# Patient Record
Sex: MALE | Race: BLACK OR AFRICAN AMERICAN
[De-identification: names, ages, dates, MRNs, and addresses within clinical notes are randomized per-mention and may not be internally consistent; named-entity substitution may affect disease eponyms.]

---

## 2017-03-27 ENCOUNTER — HOSPITAL ENCOUNTER (INPATIENT)
Dept: HOSPITAL 74 - YASAS | Age: 22
LOS: 2 days | Discharge: HOME | DRG: 897 | End: 2017-03-29
Attending: INTERNAL MEDICINE | Admitting: INTERNAL MEDICINE
Payer: COMMERCIAL

## 2017-03-27 VITALS — BODY MASS INDEX: 22.8 KG/M2

## 2017-03-27 DIAGNOSIS — Z87.898: ICD-10-CM

## 2017-03-27 DIAGNOSIS — R00.1: ICD-10-CM

## 2017-03-27 DIAGNOSIS — M41.129: ICD-10-CM

## 2017-03-27 DIAGNOSIS — F17.210: ICD-10-CM

## 2017-03-27 DIAGNOSIS — F12.20: Primary | ICD-10-CM

## 2017-03-27 PROCEDURE — HZ2ZZZZ DETOXIFICATION SERVICES FOR SUBSTANCE ABUSE TREATMENT: ICD-10-PCS | Performed by: SURGERY

## 2017-03-27 RX ADMIN — Medication SCH MG: at 22:05

## 2017-03-27 NOTE — HP
COWS





- Scale


Resting Pulse: 0= [place] 80 or Below


Sweatin= Chills/Flushing


Restless Observation: 1= Difficult to Sit Still


Pupil Size: 1= Pupils >than Normal


Bone or Joint Aches: 1= Mild Discomfort


Runny Nose/ Eye Tearin= Nasal Congestion


GI Upset > 30mins: 3= Vomiting/Diarrhea


Tremor Observation: 2= Slight Tremor Visible


Yawning Observation: 0= None


Anxiety or Irritability: 2=Irritable/Anxious


Goose Flesh Skin: 3=Piloerection


COWS Score: 15





Admission ROS S





- [facility]


Chief Complaint: 


WITHDRAWAL SX


Allergies/Adverse Reactions: 


 Allergies











Allergy/AdvReac Type Severity Reaction Status Date / Time


 


No Known Allergies Allergy   Verified 17 17:08











History of Present Illness: 


21 YEARS OLD MALE WITH LONG HISTORY OF OPIATE NICOTINE DEPENDENCE HAS SCOLIOSIS 

DENIES MENTAL ILLNESS IS ADMITTED TO DETOX


Exam Limitations: No Limitations





- Ebola screening


Have you traveled outside of the country in the last 21 days: No (N)


Have you had contact with anyone from an Ebola affected area: No


Have you been sick,other than usual withdrawal symptoms: No


Do you have a fever: No





- Review of Systems


Constitutional: Chills, Loss of Appetite, Changes in sleep, Unintentional Wgt. 

Loss, Unexplained wgt Loss


EENT: reports: No Symptoms Reported


Respiratory: reports: No Symptoms reported


Cardiac: reports: No Symptoms Reported


GI: reports: Diarrhea, Nausea, Poor Appetite, Poor Fluid Intake, Vomiting, 

Abdominal cramping


: reports: No Symptoms Reported


Musculoskeletal: reports: Back Pain, Joint Pain, Muscle Pain, Neck Pain


Integumentary: reports: No Symptoms Reported


Neuro: reports: Tremors


Endocrine: reports: No Symptoms Reported


Hematology: reports: No Symptoms Reported


Psychiatric: reports: Judgement Intact, Mood/Affect Appropiate, Orientated x3


Other Systems: Reviewed and Negative





Patient History





- Patient Medical History


Hx Anemia: No


Hx Asthma: No


Hx Chronic Obstructive Pulmonary Disease (COPD): No


Hx Cancer: No


Hx Cardiac Disorders: No


Hx Congestive Heart Failure: No


Hx Hypertension: No


Hx Hypercholesterolemia: No


Hx Pacemaker: No


HX Cerebrovascular Accident: No


Hx Seizures: No


Hx Dementia: No


Hx Diabetes: No


Hx Gastrointestinal Disorders: No


Hx Liver Disease: No


Hx Genitourinary Disorders: No


Hx Sexually Transmitted Disorders: No


Hx Renal Disease (ESRD): No


Hx Thyroid Disease: No


Hx Human Immunodeficiency Virus (HIV): No


Hx Hepatitis C: No


Hx Depression: No


Hx Suicide Attempt: No


Hx Bipolar Disorder: No


Hx Schizophrenia: No





- Patient Surgical History


Past Surgical History: No





- PPD History


Previous Implant?: Yes


Documented Results: Negative w/o proof


Implanted On Prior [facility] Admission?: No


PPD to be Administered?: Yes





- Smoking Cessation


Smoking history: Current every day smoker


Have you smoked in the past 12 months: Yes


Aproximately how many cigarettes per day: 7


Cigars Per Day: 0


Hx Chewing Tobacco Use: No


Initiated information on smoking cessation: Yes


'Breaking Loose' booklet given: 17





- Substance & Tx. History


Hx Alcohol Use: No


Hx Substance Use: Yes


Substance Use Type: Marijuana, Opiates


Hx Substance Use Treatment: Yes





- Substances Abused


  ** Heroin


Route: Inhalation


Frequency: Daily


Amount used: 10 bags


Age of first use: 21


Date of Last Use: 17





  ** Marijuana/Hashish


Route: Smoking


Frequency: 1-2 times per week


Amount used: 1 bag


Age of first use: 14


Date of Last Use: 17





Family Disease History





- Family Disease History


Family Disease History: Other: Father (NO CONTACT)





Admission Physical Exam [facility]





- Vital Signs


Vital Signs: 


 Vital Signs - 24 hr











  17





  15:55


 


Temperature 97.0 F L


 


Pulse Rate 62


 


Respiratory 20





Rate 


 


Blood Pressure 126/79














- Physical


General Appearance: Yes: Appropriately Dressed, Mild Distress, Thin, Tremorous, 

Irritable, Sweating, Anxious


HEENTM: Yes: Hearing grossly Normal, Normal ENT Inspection, Normocephalic, 

Normal Voice


Respiratory: Yes: Chest Non-Tender, Lungs Clear, Normal Breath Sounds, No 

Respiratory Distress, No Accessory Muscle Use


Neck: Yes: Supple, Trachea in good position


Breast: Yes: Breasts Symetrical


Cardiology: Yes: Regular Rhythm, Regular Rate, S1, S2


Abdominal: Yes: Non Tender, Soft, Increased Bowel Sounds


Genitourinary: Yes: Within Normal Limits


Back: Yes: Normal Inspection


Musculoskeletal: Yes: full range of Motion, Gait Steady, Back pain, Muscle Pain


Extremities: Yes: Normal Inspection, Normal Range of Motion, Non-Tender, Tremors


Neurological: Yes: Fully Oriented, Alert, Motor Strength 5/5, Normal Mood/Affect

, Normal Response


Integumentary: Yes: Warm


Lymphatic: Yes: Within Normal Limits





- Diagnostic


(1) Opioid dependence with withdrawal


Current Visit: Yes   Status: Acute





(2) Cannabis dependence, uncomplicated


Current Visit: Yes   Status: Chronic





(3) Nicotine dependence


Current Visit: Yes   Status: Acute   Qualifiers: 


     Nicotine product type: cigarettes     Substance use status: in withdrawal 

       Qualified Code(s): F17.213 - Nicotine dependence, cigarettes, with 

withdrawal  





(4) Weight loss


Current Visit: Yes   Status: Acute





(5) Scoliosis


Current Visit: Yes   Status: Chronic   Qualifiers: 


     Scoliosis type: idiopathic     Idiopathic scoliosis type: adolescent     

Spinal region: unspecified        Qualified Code(s): M41.129 - Adolescent 

idiopathic scoliosis, site unspecified  








Cleared for Admission [facility]





- Detox or Rehab


[facility] Level of Care: Medically Managed


Detox Regimen/Protocol: Methadone





S Breath Alcohol Content


Breath Alcohol Content: 0





Urine Drug Screen





- Results


Drug Screen Negative: No


Urine Drug Screen Results: THC-Marijuana, OPI-Opiates, MTD-Methadone, OXY-

Oxycodone

## 2017-03-28 LAB
ALBUMIN SERPL-MCNC: 4 G/DL (ref 3.4–5)
ALP SERPL-CCNC: 107 U/L (ref 45–117)
ALT SERPL-CCNC: 73 U/L (ref 12–78)
ANION GAP SERPL CALC-SCNC: 8 MMOL/L (ref 8–16)
APPEARANCE UR: CLEAR
AST SERPL-CCNC: 29 U/L (ref 15–37)
BILIRUB SERPL-MCNC: 0.3 MG/DL (ref 0.2–1)
BILIRUB UR STRIP.AUTO-MCNC: NEGATIVE MG/DL
CALCIUM SERPL-MCNC: 9.2 MG/DL (ref 8.5–10.1)
CO2 SERPL-SCNC: 31 MMOL/L (ref 21–32)
COLOR UR: YELLOW
CREAT SERPL-MCNC: 0.7 MG/DL (ref 0.7–1.3)
DEPRECATED RDW RBC AUTO: 13.1 % (ref 11.9–15.9)
GLUCOSE SERPL-MCNC: 97 MG/DL (ref 74–106)
HIV 1 & 2 AB: NEGATIVE
HIV 1 AGP24: NEGATIVE
KETONES UR QL STRIP: NEGATIVE
LEUKOCYTE ESTERASE UR QL STRIP.AUTO: NEGATIVE
MCH RBC QN AUTO: 30.3 PG (ref 25.7–33.7)
MCHC RBC AUTO-ENTMCNC: 33.3 G/DL (ref 32–35.9)
MCV RBC: 91.1 FL (ref 80–96)
NITRITE UR QL STRIP: NEGATIVE
PH UR: 6 [PH] (ref 5–8)
PLATELET # BLD AUTO: 200 K/MM3 (ref 134–434)
PMV BLD: 10.3 FL (ref 7.5–11.1)
PROT SERPL-MCNC: 7.1 G/DL (ref 6.4–8.2)
PROT UR QL STRIP: NEGATIVE
PROT UR QL STRIP: NEGATIVE
RBC # UR STRIP: NEGATIVE /UL
SP GR UR: 1.03 (ref 1–1.03)
UROBILINOGEN UR STRIP-MCNC: NEGATIVE E.U./DL (ref 0.2–1)
WBC # BLD AUTO: 5.8 K/MM3 (ref 4–10)

## 2017-03-28 RX ADMIN — NICOTINE SCH: 14 PATCH, EXTENDED RELEASE TRANSDERMAL at 10:14

## 2017-03-28 RX ADMIN — Medication SCH TAB: at 10:15

## 2017-03-28 RX ADMIN — Medication SCH MG: at 21:35

## 2017-03-28 NOTE — EKG
Test Reason : 

Blood Pressure : ***/*** mmHG

Vent. Rate : 058 BPM     Atrial Rate : 058 BPM

   P-R Int : 138 ms          QRS Dur : 092 ms

    QT Int : 418 ms       P-R-T Axes : 060 059 020 degrees

   QTc Int : 410 ms

 

SINUS BRADYCARDIA

OTHERWISE NORMAL ECG

NO PREVIOUS ECGS AVAILABLE

Confirmed by DINO ESPOSITO MD ([id number]) on 3/28/2017 1:42:48 PM

 

Referred By:             Confirmed By:DINO ESPOSITO MD

## 2017-03-28 NOTE — PN
[facility] COWS





- Scale


Resting Pulse: 0= [place] 80 or Below


Sweatin=Flushed/Facial Moisture


Restless Observation: 3= Extraneous Movement


Pupil Size: 1= Pupils >than Normal


Bone or Joint Aches: 2= Severe Diffuse Aches


Runny Nose/ Eye Tearin= Runny Nose/Eyes


GI Upset > 30mins: 3= Vomiting/Diarrhea


Tremor Observation of Outstretched Hands: 2= Slight Tremor Visible


Yawning Observation: 1= 1-2x During Session


Anxiety or Irritability: 2=Irritable/Anxious


Goose Flesh Skin: 0=Smooth Skin


COWS Score: 18





[facility] Progress Note (SOAP)


Subjective: 


ALERT,IRRITABLE,ANXIOUS,INTERRUPTED SLEEP,TREMOR,PAIN IN THE BODY AND BACK


Objective: 





17 10:38


 Vital Signs











Temperature  97.3 F L  17 09:56


 


Pulse Rate  58 L  17 09:56


 


Respiratory Rate  18   17 09:56


 


Blood Pressure  133/72   17 09:56


 


O2 Sat by Pulse Oximetry (%)      








EKG SINUS BRADYCARDIA 58


NO CHEST PAIN,NO SOB,NO DIZZINESS


 Laboratory Last Values











Sodium  139 mmol/L (136-145)   17  06:00    


 


Potassium  3.7 mmol/L (3.5-5.1)   17  06:00    


 


Chloride  100 mmol/L ()   17  06:00    


 


Carbon Dioxide  31 mmol/L (21-32)   17  06:00    


 


Anion Gap  8  (8-16)   17  06:00    


 


BUN  15 mg/dL (7-18)   17  06:00    


 


Creatinine  0.7 mg/dL (0.7-1.3)   17  06:00    


 


Creat Clearance w eGFR  > 60  (>60)   17  06:00    


 


Random Glucose  97 mg/dL ()   17  06:00    


 


Calcium  9.2 mg/dL (8.5-10.1)   17  06:00    


 


Total Bilirubin  0.3 mg/dL (0.2-1.0)   17  06:00    


 


AST  29 U/L (15-37)   17  06:00    


 


ALT  73 U/L (12-78)   17  06:00    


 


Alkaline Phosphatase  107 U/L ()   17  06:00    


 


Total Protein  7.1 g/dl (6.4-8.2)   17  06:00    


 


Albumin  4.0 g/dl (3.4-5.0)   17  06:00    


 


Urine Color  Yellow   17  01:00    


 


Urine Appearance  Clear   17  01:00    


 


Urine pH  6.0  (5.0-8.0)   17  01:00    


 


Ur Specific Gravity  1.026  (1.001-1.035)   17  01:00    


 


Urine Protein  Negative  (NEGATIVE)   17  01:00    


 


Urine Glucose (UA)  Negative  (NEGATIVE)   17  01:00    


 


Urine Ketones  Negative  (NEGATIVE)   17  01:00    


 


Urine Blood  Negative  (NEGATIVE)   17  01:00    


 


Urine Nitrite  Negative  (NEGATIVE)   17  01:00    


 


Urine Bilirubin  Negative  (NEGATIVE)   17  01:00    


 


Urine Urobilinogen  Negative E.U./dl (0.2-1.0)   17  01:00    


 


Ur Leukocyte Esterase  Negative  (NEGATIVE)   17  01:00    








LABS PENDING


Assessment: 





17 10:40


WITHDRAWAL SYMPTOM


Plan: 


CONTINUE DETOX

## 2017-03-29 VITALS — TEMPERATURE: 96.4 F | DIASTOLIC BLOOD PRESSURE: 67 MMHG | SYSTOLIC BLOOD PRESSURE: 105 MMHG | HEART RATE: 60 BPM

## 2017-03-29 RX ADMIN — Medication SCH TAB: at 10:42

## 2017-03-29 RX ADMIN — NICOTINE SCH: 14 PATCH, EXTENDED RELEASE TRANSDERMAL at 10:42

## 2017-03-29 NOTE — PN
[facility] COWS





- Scale


Resting Pulse: 0= [place] 80 or Below


Sweatin=Flushed/Facial Moisture


Restless Observation: 1= Difficult to Sit Still


Pupil Size: 0= Normal to Room Light


Bone or Joint Aches: 1= Mild Discomfort


Runny Nose/ Eye Tearin= Nasal Congestion


GI Upset > 30mins: 0= None


Tremor Observation of Outstretched Hands: 2= Slight Tremor Visible


Yawning Observation: 2= >3x During Session


Anxiety or Irritability: 2=Irritable/Anxious


Goose Flesh Skin: 0=Smooth Skin


COWS Score: 11





BHS Progress Note (SOAP)


Subjective: 


agitation


anxiety


sweats


interrupted sleep


irritable


Objective: 





17 11:22


 Vital Signs











Temperature  98.2 F   17 10:00


 


Pulse Rate  80   17 10:00


 


Respiratory Rate  16   17 10:00


 


Blood Pressure  128/63   17 10:00


 


O2 Sat by Pulse Oximetry (%)      








 Laboratory Tests











  17





  06:00 01:00 06:00


 


WBC    5.8


 


RBC    4.44


 


Hgb    13.5


 


Hct    40.5


 


MCV    91.1


 


MCHC    33.3


 


RDW    13.1


 


Plt Count    200


 


MPV    10.3


 


Sodium   


 


Potassium   


 


Chloride   


 


Carbon Dioxide   


 


Anion Gap   


 


BUN   


 


Creatinine   


 


Creat Clearance w eGFR   


 


Random Glucose   


 


Calcium   


 


Total Bilirubin   


 


AST   


 


ALT   


 


Alkaline Phosphatase   


 


Total Protein   


 


Albumin   


 


Urine Color   Yellow 


 


Urine Appearance   Clear 


 


Urine pH   6.0 


 


Ur Specific Gravity   1.026 


 


Urine Protein   Negative 


 


Urine Glucose (UA)   Negative 


 


Urine Ketones   Negative 


 


Urine Blood   Negative 


 


Urine Nitrite   Negative 


 


Urine Bilirubin   Negative 


 


Urine Urobilinogen   Negative 


 


Ur Leukocyte Esterase   Negative 


 


RPR Titer   


 


HIV 1&2 Antibody Screen  Negative  


 


HIV P24 Antigen  Negative  














  17





  06:00 06:00


 


WBC  


 


RBC  


 


Hgb  


 


Hct  


 


MCV  


 


MCHC  


 


RDW  


 


Plt Count  


 


MPV  


 


Sodium  139 


 


Potassium  3.7 


 


Chloride  100 


 


Carbon Dioxide  31 


 


Anion Gap  8 


 


BUN  15 


 


Creatinine  0.7 


 


Creat Clearance w eGFR  > 60 


 


Random Glucose  97 


 


Calcium  9.2 


 


Total Bilirubin  0.3 


 


AST  29 


 


ALT  73 


 


Alkaline Phosphatase  107 


 


Total Protein  7.1 


 


Albumin  4.0 


 


Urine Color  


 


Urine Appearance  


 


Urine pH  


 


Ur Specific Gravity  


 


Urine Protein  


 


Urine Glucose (UA)  


 


Urine Ketones  


 


Urine Blood  


 


Urine Nitrite  


 


Urine Bilirubin  


 


Urine Urobilinogen  


 


Ur Leukocyte Esterase  


 


RPR Titer   Nonreactive


 


HIV 1&2 Antibody Screen  


 


HIV P24 Antigen  








awake/alert


ambulating


no acute distress


Assessment: 





17 11:23


withdrawal sx


Plan: 


continue detox


increase fluids

## 2017-03-29 NOTE — PN
[facility] Progress Note


Note: 


INFORMED CLIENT LEFT OFF UNIT FOR 20 MIN UNSUPERVISED


ADMINISTRATIVELY DC


CLIENT STABLE


 Vital Signs











  03/29/17 03/29/17 03/29/17





  16:00 17:25 21:44


 


Temperature 98.1 F 97.0 F L 96.4 F L


 


Pulse Rate 67 101 H 60


 


Respiratory 18 20 20





Rate   


 


Blood Pressure 136/76 120/58 105/67

## 2017-06-02 ENCOUNTER — HOSPITAL ENCOUNTER (INPATIENT)
Dept: HOSPITAL 74 - YASAS | Age: 22
LOS: 1 days | Discharge: HOME | DRG: 897 | End: 2017-06-03
Attending: INTERNAL MEDICINE | Admitting: INTERNAL MEDICINE
Payer: COMMERCIAL

## 2017-06-02 VITALS — BODY MASS INDEX: 21.2 KG/M2

## 2017-06-02 DIAGNOSIS — F11.23: Primary | ICD-10-CM

## 2017-06-02 DIAGNOSIS — F31.81: ICD-10-CM

## 2017-06-02 DIAGNOSIS — F17.213: ICD-10-CM

## 2017-06-02 DIAGNOSIS — F12.20: ICD-10-CM

## 2017-06-02 DIAGNOSIS — M41.129: ICD-10-CM

## 2017-06-02 LAB
ALBUMIN SERPL-MCNC: 3.9 G/DL (ref 3.4–5)
ALP SERPL-CCNC: 102 U/L (ref 45–117)
ALT SERPL-CCNC: 30 U/L (ref 12–78)
ANION GAP SERPL CALC-SCNC: 8 MMOL/L (ref 8–16)
AST SERPL-CCNC: 18 U/L (ref 15–37)
BILIRUB SERPL-MCNC: 0.2 MG/DL (ref 0.2–1)
CALCIUM SERPL-MCNC: 8.9 MG/DL (ref 8.5–10.1)
CO2 SERPL-SCNC: 29 MMOL/L (ref 21–32)
COCKROFT - GAULT: 113.67
CREAT SERPL-MCNC: 0.9 MG/DL (ref 0.7–1.3)
DEPRECATED RDW RBC AUTO: 14.2 % (ref 11.9–15.9)
GLUCOSE SERPL-MCNC: 92 MG/DL (ref 74–106)
HIV 1 & 2 AB: NEGATIVE
HIV 1 AGP24: NEGATIVE
MCH RBC QN AUTO: 31.3 PG (ref 25.7–33.7)
MCHC RBC AUTO-ENTMCNC: 34.7 G/DL (ref 32–35.9)
MCV RBC: 90.4 FL (ref 80–96)
PLATELET # BLD AUTO: 231 K/MM3 (ref 134–434)
PMV BLD: 9.6 FL (ref 7.5–11.1)
PROT SERPL-MCNC: 7 G/DL (ref 6.4–8.2)
WBC # BLD AUTO: 6.4 K/MM3 (ref 4–10)

## 2017-06-02 RX ADMIN — Medication SCH TAB: at 10:11

## 2017-06-02 RX ADMIN — NICOTINE SCH MG: 14 PATCH, EXTENDED RELEASE TRANSDERMAL at 10:12

## 2017-06-02 NOTE — EKG
Test Reason : 

Blood Pressure : ***/*** mmHG

Vent. Rate : 056 BPM     Atrial Rate : 056 BPM

   P-R Int : 142 ms          QRS Dur : 096 ms

    QT Int : 414 ms       P-R-T Axes : 054 062 028 degrees

   QTc Int : 399 ms

 

SINUS BRADYCARDIA WITH SINUS ARRHYTHMIA

WHEN COMPARED WITH ECG OF 27-MAR-2017 18:34,

T WAVE AMPLITUDE HAS INCREASED IN ANTEROLATERAL LEADS

Confirmed by THOR MORGAN MD ([id number]) on 6/2/2017 9:50:43 AM

 

Referred By:             Confirmed By:THOR MORGAN MD

## 2017-06-02 NOTE — HP
COWS





- Scale


Resting Pulse: 0= [place] 80 or Below


Sweatin= Chills/Flushing


Restless Observation: 1= Difficult to Sit Still


Pupil Size: 1= Pupils >than Normal


Bone or Joint Aches: 2= Severe Diffuse Aches


Runny Nose/ Eye Tearin= Runny Nose/Eyes


GI Upset > 30mins: 2= Nausea/Diarrhea


Tremor Observation: 1= Tremor Felt, Not Seen


Yawning Observation: 1= 1-2x During Session


Anxiety or Irritability: 2=Irritable/Anxious


Goose Flesh Skin: 3=Piloerection


COWS Score: 16





Admission ROS S





- HPI


Chief Complaint: 


WITHDRAWAL SYMPTOMS 


Allergies/Adverse Reactions: 


 Allergies











Allergy/AdvReac Type Severity Reaction Status Date / Time


 


No Known Allergies Allergy   Verified 17 00:59











History of Present Illness: 


22 Y.O. MAN WITH AN EXTENSIVE HISTORY OF OPIOID DEPENDENCE IS HERE SEEKING 

DETOX.  


Exam Limitations: No Limitations





- Ebola screening


Have you traveled outside of the country in the last 21 days: No





- Review of Systems


Constitutional: Chills, Loss of Appetite, Night Sweats, Unintentional Wgt. Loss


EENT: reports: Tearing, Nose Congestion


Respiratory: reports: No Symptoms reported


Cardiac: reports: No Symptoms Reported


GI: reports: Diarrhea, Poor Appetite, Abdominal cramping


: reports: No Symptoms Reported


Musculoskeletal: reports: Back Pain, Neck Pain


Integumentary: reports: No Symptoms Reported


Neuro: reports: Tremors


Endocrine: reports: No Symptoms Reported


Hematology: reports: No Symptoms Reported


Psychiatric: reports: Mood/Affect Appropiate, Orientated x3, Anxious


Other Systems: Reviewed and Negative





Patient History





- Patient Medical History


Hx Anemia: No


Hx Asthma: No


Hx Chronic Obstructive Pulmonary Disease (COPD): No


Hx Cancer: No


Hx Cardiac Disorders: No


Hx Congestive Heart Failure: No


Hx Hypertension: No


Hx Hypercholesterolemia: No


Hx Pacemaker: No


HX Cerebrovascular Accident: No


Hx Seizures: No


Hx Dementia: No


Hx Diabetes: No


Hx Gastrointestinal Disorders: No


Hx Liver Disease: No


Hx Genitourinary Disorders: No


Hx Sexually Transmitted Disorders: No


Hx Renal Disease (ESRD): No


Hx Thyroid Disease: No


Hx Human Immunodeficiency Virus (HIV): No


Hx Hepatitis C: No


Hx Depression: No


Hx Suicide Attempt: No


Hx Bipolar Disorder: No


Hx Schizophrenia: No





- Patient Surgical History


Past Surgical History: No





- PPD History


Previous Implant?: Yes


Documented Results: Negative w/proof


Implanted On Prior [facility] Admission?: Yes


Date: 17


Results: 0


PPD to be Administered?: No





- Reproductive History


Patient is a Female of Child Bearing Age (11 -55 yrs old): No





- Smoking Cessation


Smoking history: Current every day smoker


Have you smoked in the past 12 months: Yes


Aproximately how many cigarettes per day: 5


Cigars Per Day: 0


Hx Chewing Tobacco Use: No


Initiated information on smoking cessation: Yes


'Breaking Loose' booklet given: 17





- Substance & Tx. History


Hx Alcohol Use: No


Hx Substance Use: Yes


Substance Use Type: Heroin


Hx Substance Use Treatment: Yes (Detox )





- Substances Abused


  ** Heroin


Route: Inhalation


Frequency: Daily


Amount used: 1 bundle 


Age of first use: 21


Date of Last Use: 17





Family Disease History





- Family Disease History


Family Disease History: Other: Father (NO CONTACT)





Admission Physical Exam [facility]





- Vital Signs


Vital Signs: 


 Last Vital Signs











Temp Pulse Resp BP Pulse Ox


 


 97.1 F L  57 L  16   130/71    


 


 17 00:52  17 00:52  17 00:52  17 00:52   














- Diagnostic


(1) Nicotine dependence


Current Visit: Yes   Status: Chronic   Qualifiers: 


     Nicotine product type: cigarettes     Substance use status: in withdrawal 

       Qualified Code(s): F17.213 - Nicotine dependence, cigarettes, with 

withdrawal  





(2) Opioid dependence with withdrawal


Current Visit: Yes   Status: Chronic





(3) Weight loss


Current Visit: Yes   Status: Acute





(4) Scoliosis


Current Visit: Yes   Status: Chronic   Qualifiers: 


     Scoliosis type: idiopathic     Idiopathic scoliosis type: adolescent     

Spinal region: unspecified        Qualified Code(s): M41.129 - Adolescent 

idiopathic scoliosis, site unspecified  








Cleared for Admission [facility]





- Detox or Rehab


S Level of Care: Medically Managed


Detox Regimen/Protocol: Methadone





BHS Breath Alcohol Content


Breath Alcohol Content: 0





Vital Signs





- Vital Signs


Vital Signs Refused: No


Temperature: 97.1 F


Temperature Source: Oral


Pulse Rate: 57


Respiratory Rate: 16


Blood Pressure: 130/71


BP Location: Left Arm


Blood Pressure Position: Sitting





- Height


Height: 5 ft 8 in





- Weight


Weight: 140 lb


Weight Measurement Method: Standing Scale


Body Mass Index (BMI): 21.2





Urine Drug Screen





- Test Device


Lot Number: [id number]


Expiration Date: 19





- Control


Is Test Valid: Yes





- Results


Drug Screen Negative: No


Urine Drug Screen Results: THC-Marijuana, OPI-Opiates, MTD-Methadone

## 2017-06-02 NOTE — PN
[facility] COWS





- Scale


Resting Pulse: 0= [place] 80 or Below


Sweatin=Flushed/Facial Moisture


Restless Observation: 1= Difficult to Sit Still


Pupil Size: 0= Normal to Room Light


Bone or Joint Aches: 2= Severe Diffuse Aches


Runny Nose/ Eye Tearin= Nasal Congestion


GI Upset > 30mins: 2= Nausea/Diarrhea


Tremor Observation of Outstretched Hands: 2= Slight Tremor Visible


Yawning Observation: 1= 1-2x During Session


Anxiety or Irritability: 2=Irritable/Anxious


Goose Flesh Skin: 3=Piloerection


COWS Score: 16





BHS Progress Note (SOAP)


Subjective: 


Diarrhea, Interrupted sleep, Body Aches, Stomach Cramping, Sweating.


Objective: 


PT. A & O X 3, OBSERVED AMBULATING ON UNIT. NO ACUTE DISTRESS.





17 12:02


 Vital Signs











Temperature  97.3 F L  17 10:07


 


Pulse Rate  67   17 10:07


 


Respiratory Rate  18   17 10:07


 


Blood Pressure  129/63   17 10:07


 


O2 Sat by Pulse Oximetry (%)      








 Laboratory Tests











  17





  07:50 07:50


 


WBC  6.4 


 


RBC  4.46 


 


Hgb  14.0 


 


Hct  40.3 


 


MCV  90.4 


 


MCHC  34.7 


 


RDW  14.2 


 


Plt Count  231 


 


MPV  9.6 


 


Sodium   143


 


Potassium   3.9


 


Chloride   106


 


Carbon Dioxide   29


 


Anion Gap   8


 


BUN   15


 


Creatinine   0.9  D


 


Creat Clearance w eGFR   > 60


 


Random Glucose   92


 


Calcium   8.9


 


Total Bilirubin   0.2  D


 


AST   18  D


 


ALT   30  D


 


Alkaline Phosphatase   102


 


Total Protein   7.0


 


Albumin   3.9








LABS NOTED.





17 12:03








17 12:05





Assessment: 





17 12:03


WITHDRAWAL SYMPTOMS.


Plan: 


CONTINUE DETOX.


PRN IMMODIUM FOR DIARRHEA.

## 2017-06-02 NOTE — CONSULT
[facility] Psychiatric Consult





- Data


Date of interview: 06/02/17


Admission source: [facility]


Identifying data: This is 23 yo single  male,unemployed,undomiciled 

admitted to [address] [place] [address] for Opioid dependence.


Substance Abuse History: Patient reports heroin use since 21 years old,1 bundle 

daily,marijuana since young age daily user.


Medical History: Significant for Scoliosis.


Psychiatric History: Patient is poor historian due to overly sedation.He 

reports history of Bipolar disorder.No psychiatric hospitalizations,no suicidal 

attempts reported.Patient is not on any psychotropic medications at present,but 

reports taking Seroquel on and off.Seroquel was not ordered at this time due to 

his drowsiness,sedation.Consider mood stabilizers if needed.


Physical/Sexual Abuse/Trauma History: denies





Mental Status Exam





- Mental Status Exam


Cognitive Function: Fair


Patient Appearance: Unkempt


Mood: Apathetic, Withdrawn


Affect: Mood Congruent


Patient Behavior: Sedated


Speech Pattern: Delayed


Voice Loudness: Normal


Thought Process: Goal Oriented


Thought Disorder: Not Present


Hallucinations: Denies


Suicidal Ideation: Denies


Homicidal Ideation: Denies


Insight/Judgement: Impaired


Sleep: Fair


Appetite: Fair


Muscle strength/Tone: Normal


Gait/Station: Normal





Psychiatric Findings





- Problem List (Axis 1, 2,3)


(1) Nicotine dependence


Current Visit: Yes   Status: Chronic   Qualifiers: 


     Nicotine product type: cigarettes     Substance use status: in withdrawal 

       Qualified Code(s): F17.213 - Nicotine dependence, cigarettes, with 

withdrawal  





(2) Opioid dependence with withdrawal


Current Visit: Yes   Status: Chronic





(3) Scoliosis


Current Visit: Yes   Status: Chronic   Qualifiers: 


     Scoliosis type: idiopathic     Idiopathic scoliosis type: adolescent     

Spinal region: unspecified        Qualified Code(s): M41.129 - Adolescent 

idiopathic scoliosis, site unspecified  





(4) Cannabis dependence, uncomplicated


Current Visit: Yes   Status: Chronic





(5) Bipolar II disorder


Current Visit: Yes   Status: Chronic








- Initial Treatment Plan


Initial Treatment Plan: Will monitor porgress.Consider mood stabilizers if 

needed.

## 2017-06-03 VITALS — DIASTOLIC BLOOD PRESSURE: 64 MMHG | SYSTOLIC BLOOD PRESSURE: 116 MMHG | HEART RATE: 65 BPM | TEMPERATURE: 97.6 F

## 2017-06-03 LAB
APPEARANCE UR: CLEAR
BILIRUB UR STRIP.AUTO-MCNC: NEGATIVE MG/DL
COLOR UR: (no result)
KETONES UR QL STRIP: NEGATIVE
LEUKOCYTE ESTERASE UR QL STRIP.AUTO: NEGATIVE
NITRITE UR QL STRIP: NEGATIVE
PH UR: 6 [PH] (ref 5–8)
PROT UR QL STRIP: NEGATIVE
PROT UR QL STRIP: NEGATIVE
RBC # UR STRIP: NEGATIVE /UL
SP GR UR: 1.02 (ref 1–1.02)
UROBILINOGEN UR STRIP-MCNC: NEGATIVE E.U./DL (ref 0.2–1)

## 2017-06-03 PROCEDURE — HZ2ZZZZ DETOXIFICATION SERVICES FOR SUBSTANCE ABUSE TREATMENT: ICD-10-PCS | Performed by: INTERNAL MEDICINE

## 2017-06-03 RX ADMIN — NICOTINE SCH MG: 14 PATCH, EXTENDED RELEASE TRANSDERMAL at 10:09

## 2017-06-03 RX ADMIN — Medication SCH TAB: at 10:09

## 2017-06-03 NOTE — PN
[facility] COWS





- Scale


Resting Pulse: 0= [place] 80 or Below


Sweatin=Flushed/Facial Moisture


Restless Observation: 1= Difficult to Sit Still


Pupil Size: 0= Normal to Room Light


Bone or Joint Aches: 2= Severe Diffuse Aches


Runny Nose/ Eye Tearin= Nasal Congestion


GI Upset > 30mins: 2= Nausea/Diarrhea


Tremor Observation of Outstretched Hands: 2= Slight Tremor Visible


Yawning Observation: 1= 1-2x During Session


Anxiety or Irritability: 2=Irritable/Anxious


Goose Flesh Skin: 3=Piloerection


COWS Score: 16





BHS Progress Note (SOAP)


Subjective: 


Stomach Cramping, Body aches, H/A, Diarrhea, Interrupted Sleep, Tremors, 

Sweating.


Objective: 


PT. A & O X 3. NO ACUTE DISTRESS.





17 14:37


 Vital Signs











Temperature  98.2 F   17 13:00


 


Pulse Rate  58 L  17 13:00


 


Respiratory Rate  18   17 13:00


 


Blood Pressure  113/59   17 13:00


 


O2 Sat by Pulse Oximetry (%)      








 Laboratory Tests











  17





  07:50 07:50 07:50


 


WBC   6.4 


 


RBC   4.46 


 


Hgb   14.0 


 


Hct   40.3 


 


MCV   90.4 


 


MCHC   34.7 


 


RDW   14.2 


 


Plt Count   231 


 


MPV   9.6 


 


Sodium    143


 


Potassium    3.9


 


Chloride    106


 


Carbon Dioxide    29


 


Anion Gap    8


 


BUN    15


 


Creatinine    0.9  D


 


Creat Clearance w eGFR    > 60


 


Random Glucose    92


 


Calcium    8.9


 


Total Bilirubin    0.2  D


 


AST    18  D


 


ALT    30  D


 


Alkaline Phosphatase    102


 


Total Protein    7.0


 


Albumin    3.9


 


Urine Color   


 


Urine Appearance   


 


Urine pH   


 


Ur Specific Gravity   


 


Urine Protein   


 


Urine Glucose (UA)   


 


Urine Ketones   


 


Urine Blood   


 


Urine Nitrite   


 


Urine Bilirubin   


 


Urine Urobilinogen   


 


Ur Leukocyte Esterase   


 


Hepatitis C Antibody  <0.1  


 


HIV 1&2 Antibody Screen   


 


HIV P24 Antigen   














  17





  07:50 07:00


 


WBC  


 


RBC  


 


Hgb  


 


Hct  


 


MCV  


 


MCHC  


 


RDW  


 


Plt Count  


 


MPV  


 


Sodium  


 


Potassium  


 


Chloride  


 


Carbon Dioxide  


 


Anion Gap  


 


BUN  


 


Creatinine  


 


Creat Clearance w eGFR  


 


Random Glucose  


 


Calcium  


 


Total Bilirubin  


 


AST  


 


ALT  


 


Alkaline Phosphatase  


 


Total Protein  


 


Albumin  


 


Urine Color   Ltyellow


 


Urine Appearance   Clear


 


Urine pH   6.0


 


Ur Specific Gravity   1.025


 


Urine Protein   Negative


 


Urine Glucose (UA)   Negative


 


Urine Ketones   Negative


 


Urine Blood   Negative


 


Urine Nitrite   Negative


 


Urine Bilirubin   Negative


 


Urine Urobilinogen   Negative


 


Ur Leukocyte Esterase   Negative


 


Hepatitis C Antibody  


 


HIV 1&2 Antibody Screen  Negative 


 


HIV P24 Antigen  Negative 








LABS NOTED.


Assessment: 





17 14:38


WITHDRAWAL SYMPTOMS.


Plan: 


CONTINUE DETOX.

## 2017-06-03 NOTE — PN
[facility] Progress Note


Note: 


CLIENT ADMINISTRATIVELY DC FOR PHYSICAL AGRESSION TOWARDS ANOTHER PT. CLIENT IS 

A/O X3 NAD. VSS


 Vital Signs











Temperature  99.0 F   06/03/17 17:27


 


Pulse Rate  72   06/03/17 17:27


 


Respiratory Rate  18   06/03/17 17:27


 


Blood Pressure  117/69   06/03/17 17:27


 


O2 Sat by Pulse Oximetry (%)

## 2017-06-04 NOTE — DS
[facility] Discharge Summary


Admission Date: 


06/02/17





Discharge Date: 06/03/17





- History


Present History: Cannabis Dependence, Opioid Dependence


Pertinent Past History: 


Mood disorder





- Physical Exam Results


Vital Signs: 


 Vital Signs











Temperature  97.6 F   06/03/17 23:03


 


Pulse Rate  65   06/03/17 23:03


 


Respiratory Rate  18   06/03/17 23:03


 


Blood Pressure  116/64   06/03/17 23:03


 


O2 Sat by Pulse Oximetry (%)      











Pertinent Admission Physical Exam Findings: 


Withdrawal sx.


 Laboratory Last Values











WBC  6.4 K/mm3 (4.0-10.0)   06/02/17  07:50    


 


RBC  4.46 M/mm3 (4.00-5.60)   06/02/17  07:50    


 


Hgb  14.0 GM/dL (11.7-16.9)   06/02/17  07:50    


 


Hct  40.3 % (35.4-49)   06/02/17  07:50    


 


MCV  90.4 fl (80-96)   06/02/17  07:50    


 


MCHC  34.7 g/dl (32.0-35.9)   06/02/17  07:50    


 


RDW  14.2 % (11.9-15.9)   06/02/17  07:50    


 


Plt Count  231 K/MM3 (134-434)   06/02/17  07:50    


 


MPV  9.6 fl (7.5-11.1)   06/02/17  07:50    


 


Sodium  143 mmol/L (136-145)   06/02/17  07:50    


 


Potassium  3.9 mmol/L (3.5-5.1)   06/02/17  07:50    


 


Chloride  106 mmol/L ()   06/02/17  07:50    


 


Carbon Dioxide  29 mmol/L (21-32)   06/02/17  07:50    


 


Anion Gap  8  (8-16)   06/02/17  07:50    


 


BUN  15 mg/dL (7-18)   06/02/17  07:50    


 


Creatinine  0.9 mg/dL (0.7-1.3)  D 06/02/17  07:50    


 


Creat Clearance w eGFR  > 60  (>60)   06/02/17  07:50    


 


Random Glucose  92 mg/dL ()   06/02/17  07:50    


 


Calcium  8.9 mg/dL (8.5-10.1)   06/02/17  07:50    


 


Total Bilirubin  0.2 mg/dL (0.2-1.0)  D 06/02/17  07:50    


 


AST  18 U/L (15-37)  D 06/02/17  07:50    


 


ALT  30 U/L (12-78)  D 06/02/17  07:50    


 


Alkaline Phosphatase  102 U/L ()   06/02/17  07:50    


 


Total Protein  7.0 g/dl (6.4-8.2)   06/02/17  07:50    


 


Albumin  3.9 g/dl (3.4-5.0)   06/02/17  07:50    


 


Urine Color  Ltyellow   06/03/17  07:00    


 


Urine Appearance  Clear   06/03/17  07:00    


 


Urine pH  6.0  (5.0-8.0)   06/03/17  07:00    


 


Ur Specific Gravity  1.025  (1.005-1.025)   06/03/17  07:00    


 


Urine Protein  Negative  (NEGATIVE)   06/03/17  07:00    


 


Urine Glucose (UA)  Negative  (NEGATIVE)   06/03/17  07:00    


 


Urine Ketones  Negative  (NEGATIVE)   06/03/17  07:00    


 


Urine Blood  Negative  (NEGATIVE)   06/03/17  07:00    


 


Urine Nitrite  Negative  (NEGATIVE)   06/03/17  07:00    


 


Urine Bilirubin  Negative  (NEGATIVE)   06/03/17  07:00    


 


Urine Urobilinogen  Negative E.U./dl (0.2-1.0)   06/03/17  07:00    


 


Ur Leukocyte Esterase  Negative  (NEGATIVE)   06/03/17  07:00    


 


RPR Titer  Nonreactive  (NONREACTIVE)   06/03/17  09:20    


 


Hepatitis C Antibody  <0.1 s/co ratio (0.0-0.9)   06/02/17  07:50    


 


HIV 1&2 Antibody Screen  Negative   06/02/17  07:50    


 


HIV P24 Antigen  Negative   06/02/17  07:50    








labs noted





- Treatment


Patient has Accepted a Rehab Referral to: IOP





- Medication


Discharge Medications: 


Ambulatory Orders





NK [No Known Home Medication]  06/04/17 











- Diagnosis


(1) Bipolar II disorder


Status: Chronic





(2) Cannabis dependence, uncomplicated


Status: Acute





(3) Nicotine dependence


Status: Acute   Qualifiers: 


     Nicotine product type: cigarettes     Substance use status: in withdrawal 

       Qualified Code(s): F17.213 - Nicotine dependence, cigarettes, with 

withdrawal  





(4) Opioid dependence with withdrawal


Status: Acute








- AMA


Did Patient Leave Against Medical Advice: No

## 2017-07-10 ENCOUNTER — EMERGENCY (EMERGENCY)
Facility: HOSPITAL | Age: 22
LOS: 1 days | Discharge: HOME | End: 2017-07-10
Admitting: EMERGENCY MEDICINE
Payer: COMMERCIAL

## 2017-07-10 VITALS
RESPIRATION RATE: 17 BRPM | TEMPERATURE: 98 F | WEIGHT: 145.06 LBS | HEART RATE: 60 BPM | OXYGEN SATURATION: 97 % | SYSTOLIC BLOOD PRESSURE: 115 MMHG | DIASTOLIC BLOOD PRESSURE: 72 MMHG

## 2017-07-10 DIAGNOSIS — M54.5 LOW BACK PAIN: ICD-10-CM

## 2017-07-10 DIAGNOSIS — Z88.8 ALLERGY STATUS TO OTHER DRUGS, MEDICAMENTS AND BIOLOGICAL SUBSTANCES STATUS: ICD-10-CM

## 2017-07-10 DIAGNOSIS — M54.6 PAIN IN THORACIC SPINE: ICD-10-CM

## 2017-07-10 PROCEDURE — 99282 EMERGENCY DEPT VISIT SF MDM: CPT | Mod: 25

## 2017-07-10 PROCEDURE — 99282 EMERGENCY DEPT VISIT SF MDM: CPT

## 2017-07-10 RX ORDER — LIDOCAINE 4 G/100G
1 CREAM TOPICAL
Qty: 14 | Refills: 0 | OUTPATIENT
Start: 2017-07-10

## 2017-07-10 RX ORDER — CYCLOBENZAPRINE HYDROCHLORIDE 10 MG/1
1 TABLET, FILM COATED ORAL
Qty: 12 | Refills: 0 | OUTPATIENT
Start: 2017-07-10

## 2017-07-10 RX ORDER — IBUPROFEN 200 MG
600 TABLET ORAL ONCE
Qty: 0 | Refills: 0 | Status: COMPLETED | OUTPATIENT
Start: 2017-07-10 | End: 2017-07-10

## 2017-07-10 RX ORDER — CYCLOBENZAPRINE HYDROCHLORIDE 10 MG/1
10 TABLET, FILM COATED ORAL ONCE
Qty: 0 | Refills: 0 | Status: COMPLETED | OUTPATIENT
Start: 2017-07-10 | End: 2017-07-10

## 2017-07-10 NOTE — ED PROVIDER NOTE - NS ED ROS FT
CONST:  no fever/chills  NEURO: no headache or dizziness, no weakness  CARDIO: no chest pain  PULM: no dyspnea   GI: no abdominal pain, nausea or vomiting
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
## 2017-07-10 NOTE — ED PROVIDER NOTE - PHYSICAL EXAMINATION
CONSTITUTIONAL: WD,WN. NAD.    SKIN: Normal color and turgor. No rash.    EYES: Conjunctiva clear. EOMI. PERRL.    ENT: Airway patent, OP clear. Nasal mucosa clear, no rhinorrhea.   RESPIRATORY:  Breathing non-labored. No retractions, accessory muscle use.  Lungs CTA bilat.  CARDIOVASCULAR:  RRR, S1S2. No M/R/G.      GI:  Abdomen soft, nontender.    MSK: No joint swelling.  Thoracolumbar TTP noted.  FROM; changes positions without any apparent difficulty.  No extremity edema or tenderness.    NEURO: Alert and oriented; PRINCE with 5/5 strength. SILT all extremities. DTR +2 bilateral biceps, BR, patellar, achilles. Rectal tone normal.  Normal speech and gait.

## 2017-07-10 NOTE — ED PROVIDER NOTE - PROGRESS NOTE DETAILS
patient refused XR.  repeatedly asking for narcotic pain medications.  reports SE to NSAIDs.  I-STOP shows pt received a 10-day supply of suboxone five days ago.

## 2017-07-10 NOTE — ED ADULT NURSE NOTE - CHPI ED SYMPTOMS NEG
no fatigue/no numbness/no anorexia/no neck tenderness/no constipation/no bowel dysfunction/no motor function loss/no difficulty bearing weight/no bladder dysfunction/no tingling

## 2017-07-10 NOTE — ED ADULT TRIAGE NOTE - CHIEF COMPLAINT QUOTE
" 3 days ago hurt my back during wrestling, low back shooting pain, radiates to left leg, leg tingling"
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
## 2023-08-16 NOTE — ED ADULT TRIAGE NOTE - PAIN: PRESENCE, MLM
complains of pain/discomfort
PAST SURGICAL HISTORY:  Esophageal stenosis     H/O endoscopy     H/O lithotripsy     History of chemotherapy     History of esophagogastroduodenoscopy (EGD)     S/P cholecystectomy     S/P radiation therapy

## 2024-05-17 NOTE — ED ADULT NURSE NOTE - FINAL NURSING ELECTRONIC SIGNATURE
Received call from Brianna Lawrence, CHRISTUS St. Vincent Physicians Medical CenterS.    10-Jul-2017 06:02
